# Patient Record
Sex: FEMALE | Race: WHITE
[De-identification: names, ages, dates, MRNs, and addresses within clinical notes are randomized per-mention and may not be internally consistent; named-entity substitution may affect disease eponyms.]

---

## 2018-06-13 ENCOUNTER — HOSPITAL ENCOUNTER (EMERGENCY)
Dept: HOSPITAL 56 - MW.ED | Age: 43
LOS: 1 days | Discharge: HOME | End: 2018-06-14
Payer: COMMERCIAL

## 2018-06-13 DIAGNOSIS — Z79.899: ICD-10-CM

## 2018-06-13 DIAGNOSIS — Z88.2: ICD-10-CM

## 2018-06-13 DIAGNOSIS — S69.92XA: Primary | ICD-10-CM
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
## 2018-06-13 NOTE — EDM.PDOC
ED HPI GENERAL MEDICAL PROBLEM





- General


Chief Complaint: Upper Extremity Injury/Pain


Stated Complaint: POSSIBLE BROKEN LEFT HAND, ALSO HIT HEAD


Time Seen by Provider: 06/13/18 22:00





- History of Present Illness


INITIAL COMMENTS - FREE TEXT/NARRATIVE: 





HISTORY AND PHYSICAL:





History of present illness:


Patient's 43-year-old female presents concerned status post fall rollerskating 

injuring her left wrist donating her head she denies loss consciousness nausea 

vomiting denies other trauma or concern.





Review of systems: 


As per history of present illness and below otherwise all systems reviewed and 

negative.





Past medical history: 


As per history of present illness and as reviewed below otherwise 

noncontributory.





Surgical history: 


As per history of present illness and as reviewed below otherwise 

noncontributory.





Social history: 


No reported history of drug or alcohol abuse.





Family history: 


As per history of present illness and as reviewed below otherwise 

noncontributory.





Physical exam:


HEENT: Atraumatic, normocephalic, pupils reactive, negative for conjunctival 

pallor or scleral icterus, mucous membranes moist, throat clear, neck supple, 

nontender, trachea midline.


Lungs: Clear to auscultation, breath sounds equal bilaterally, chest nontender.


Heart: S1S2, regular, negative for clicks, rubs, or JVD.


Abdomen: Soft, nondistended, nontender. Negative for masses or 

hepatosplenomegaly. Negative for costovertebral tenderness.


Pelvis: Stable nontender.


Genitourinary: Deferred.


Rectal: Deferred.


Extremities: Left wrist is tenderness over the dorsal aspect no point 

tenderness no crepitation CMS neurovascular is unremarkable is no snuffbox 

tenderness


Neuro: Awake, alert, oriented. Cranial nerves II through XII unremarkable. 

Cerebellum unremarkable. Motor and sensory unremarkable throughout. Exam 

nonfocal.





Diagnostics:


X-ray left wrist CT brain deferred by patient





Therapeutics:


To be determined





Impression: 


#1 observation status post fall #2 acute left wrist injury #3 minor head injury





Definitive disposition and diagnosis as appropriate pending reevaluation and 

review of above.


  ** left hand


Pain Score (Numeric/FACES): 5





- Related Data


 Allergies











Allergy/AdvReac Type Severity Reaction Status Date / Time


 


Sulfa (Sulfonamide Allergy  Rash Verified 06/13/18 22:12





Antibiotics)     











Home Meds: 


 Home Meds





Citalopram Hydrobromide [Celexa] 30 mg PO 06/13/18 [History]


Topiramate [Topamax] 50 mg PO DAILY 06/13/18 [History]











Review of Systems





- Review of Systems


Review Of Systems: ROS reveals no pertinent complaints other than HPI.





ED EXAM, GENERAL





- Physical Exam


Exam: See Below (See dictation)





Course





- Vital Signs


Last Recorded V/S: 


 Last Vital Signs











Temp  36.6 C   06/13/18 22:10


 


Pulse  69   06/13/18 22:10


 


Resp  18   06/13/18 22:10


 


BP  110/69   06/13/18 22:10


 


Pulse Ox  98   06/13/18 22:10














- Orders/Labs/Meds


Orders: 


 Active Orders 24 hr











 Category Date Time Status


 


 Wrist 2V Lt [CR] Stat Exams  06/13/18 22:19 Taken














Departure





- Departure


Time of Disposition: 00:32


Disposition: Home, Self-Care 01


Condition: Good


Clinical Impression: 


 Wrist injury








- Discharge Information


Referrals: 


PCP,None [Primary Care Provider] - 


Forms:  ED Department Discharge


Additional Instructions: 











The following information is given to patients seen in the emergency department 

who are being discharged to home. This information is to outline your options 

for follow-up care. We provide all patients seen in our emergency department 

with a follow-up referral.





The need for follow-up, as well as the timing and circumstances, are variable 

depending upon the specifics of your emergency department visit.





If you don't have a primary care physician on staff, we will provide you with a 

referral. We always advise you to contact your personal physician following an 

emergency department visit to inform them of the circumstance of the visit and 

for follow-up with them and/or the need for any referrals to a consulting 

specialist.





The emergency department will also refer you to a specialist when appropriate. 

This referral assures that you have the opportunity for followup care with a 

specialist. All of these measure are taken in an effort to provide you with 

optimal care, which includes your followup.





Under all circumstances we always encourage you to contact your private 

physician who remains a resource for coordinating  your care. When calling for 

followup care, please make the office aware that this follow-up is from your 

recent emergency room visit. If for any reason you are refused follow-up, 

please contact the [facility] emergency department at [phone] 

and asked to speak to the emergency department charge nurse.























Splint as directed Motrin/Tylenol as directed follow-up private medical doctor 

return as needed as discussed[]








- My Orders


Last 24 Hours: 


My Active Orders





06/13/18 22:19


Wrist 2V Lt [CR] Stat 














- Assessment/Plan


Last 24 Hours: 


My Active Orders





06/13/18 22:19


Wrist 2V Lt [CR] Stat

## 2018-06-14 NOTE — CR
EXAM DATE: 18



PATIENT'S AGE: 42





Patient: ZAIRA GARCÍA



Facility: [place], [place]

Patient ID: [id number]

Site Patient ID: [id number].

Site Accession #: [id number]

: 1975

Study: XRay Extremity Left WRIST [id number]/13/2018 10:39:13 PM

Ordering Physician: Papo Moon



Final Report: 

INDICATION: L wrist pain



TECHNIQUE:

Two views of the left wrist



COMPARISON:

None 



FINDINGS:

Bones: No fractures or bone lesions. 



Joint spaces: Unremarkable. 



Soft tissues: Unremarkable. 



IMPRESSION:

No acute bony abnormality



Dictated by Ibrahima Hilario MD @ 2018 10:53:12 PM







Dictated by: Ibrahima Hilario MD @ 2018 22:57:27

(Electronic Signature)



Report Signed by Proxy.
[facility]